# Patient Record
Sex: FEMALE | Race: WHITE | Employment: OTHER | URBAN - METROPOLITAN AREA
[De-identification: names, ages, dates, MRNs, and addresses within clinical notes are randomized per-mention and may not be internally consistent; named-entity substitution may affect disease eponyms.]

---

## 2022-11-04 ENCOUNTER — APPOINTMENT (OUTPATIENT)
Dept: CT IMAGING | Age: 82
End: 2022-11-04
Payer: MEDICARE

## 2022-11-04 ENCOUNTER — APPOINTMENT (OUTPATIENT)
Dept: GENERAL RADIOLOGY | Age: 82
End: 2022-11-04
Payer: MEDICARE

## 2022-11-04 ENCOUNTER — HOSPITAL ENCOUNTER (EMERGENCY)
Age: 82
Discharge: HOME | End: 2022-11-04
Attending: EMERGENCY MEDICINE
Payer: MEDICARE

## 2022-11-04 VITALS
RESPIRATION RATE: 26 BRPM | DIASTOLIC BLOOD PRESSURE: 116 MMHG | HEIGHT: 63 IN | HEART RATE: 83 BPM | WEIGHT: 125 LBS | SYSTOLIC BLOOD PRESSURE: 152 MMHG | OXYGEN SATURATION: 97 % | TEMPERATURE: 98.1 F | BODY MASS INDEX: 22.15 KG/M2

## 2022-11-04 DIAGNOSIS — R55 SYNCOPE, UNSPECIFIED SYNCOPE TYPE: Primary | ICD-10-CM

## 2022-11-04 DIAGNOSIS — B37.9 YEAST INFECTION: ICD-10-CM

## 2022-11-04 DIAGNOSIS — E86.0 DEHYDRATION: ICD-10-CM

## 2022-11-04 LAB
A/G RATIO: 1.6 (ref 1.1–2.2)
ALBUMIN SERPL-MCNC: 3.9 G/DL (ref 3.4–5)
ALP BLD-CCNC: 70 U/L (ref 40–129)
ALT SERPL-CCNC: 16 U/L (ref 10–40)
ANION GAP SERPL CALCULATED.3IONS-SCNC: 8 MMOL/L (ref 3–16)
AST SERPL-CCNC: 23 U/L (ref 15–37)
BASOPHILS ABSOLUTE: 0 K/UL (ref 0–0.2)
BASOPHILS RELATIVE PERCENT: 0.4 %
BILIRUB SERPL-MCNC: 0.3 MG/DL (ref 0–1)
BILIRUBIN URINE: NEGATIVE
BLOOD, URINE: NEGATIVE
BUN BLDV-MCNC: 21 MG/DL (ref 7–20)
CALCIUM SERPL-MCNC: 9.2 MG/DL (ref 8.3–10.6)
CHLORIDE BLD-SCNC: 100 MMOL/L (ref 99–110)
CLARITY: CLEAR
CO2: 24 MMOL/L (ref 21–32)
COLOR: YELLOW
CREAT SERPL-MCNC: <0.5 MG/DL (ref 0.6–1.2)
EOSINOPHILS ABSOLUTE: 0.1 K/UL (ref 0–0.6)
EOSINOPHILS RELATIVE PERCENT: 1.4 %
EPITHELIAL CELLS, UA: ABNORMAL /HPF (ref 0–5)
GFR SERPL CREATININE-BSD FRML MDRD: >60 ML/MIN/{1.73_M2}
GLUCOSE BLD-MCNC: 148 MG/DL (ref 70–99)
GLUCOSE BLD-MCNC: 149 MG/DL (ref 70–99)
GLUCOSE URINE: NEGATIVE MG/DL
HCT VFR BLD CALC: 33.9 % (ref 36–48)
HEMOGLOBIN: 11.7 G/DL (ref 12–16)
KETONES, URINE: NEGATIVE MG/DL
LEUKOCYTE ESTERASE, URINE: NEGATIVE
LYMPHOCYTES ABSOLUTE: 1.5 K/UL (ref 1–5.1)
LYMPHOCYTES RELATIVE PERCENT: 15.5 %
MCH RBC QN AUTO: 33.7 PG (ref 26–34)
MCHC RBC AUTO-ENTMCNC: 34.5 G/DL (ref 31–36)
MCV RBC AUTO: 97.7 FL (ref 80–100)
MICROSCOPIC EXAMINATION: ABNORMAL
MONOCYTES ABSOLUTE: 0.7 K/UL (ref 0–1.3)
MONOCYTES RELATIVE PERCENT: 7 %
NEUTROPHILS ABSOLUTE: 7.5 K/UL (ref 1.7–7.7)
NEUTROPHILS RELATIVE PERCENT: 75.7 %
NITRITE, URINE: NEGATIVE
PDW BLD-RTO: 12.7 % (ref 12.4–15.4)
PERFORMED ON: ABNORMAL
PH UA: 7 (ref 5–8)
PLATELET # BLD: 251 K/UL (ref 135–450)
PMV BLD AUTO: 7.6 FL (ref 5–10.5)
POTASSIUM SERPL-SCNC: 4.5 MMOL/L (ref 3.5–5.1)
PROTEIN UA: NEGATIVE MG/DL
RBC # BLD: 3.47 M/UL (ref 4–5.2)
RBC UA: ABNORMAL /HPF (ref 0–4)
SODIUM BLD-SCNC: 132 MMOL/L (ref 136–145)
SPECIFIC GRAVITY UA: 1.01 (ref 1–1.03)
TOTAL PROTEIN: 6.3 G/DL (ref 6.4–8.2)
TROPONIN: <0.01 NG/ML
URINE TYPE: ABNORMAL
UROBILINOGEN, URINE: 0.2 E.U./DL
WBC # BLD: 9.9 K/UL (ref 4–11)
WBC UA: ABNORMAL /HPF (ref 0–5)
YEAST: PRESENT /HPF

## 2022-11-04 PROCEDURE — 81001 URINALYSIS AUTO W/SCOPE: CPT

## 2022-11-04 PROCEDURE — 96360 HYDRATION IV INFUSION INIT: CPT

## 2022-11-04 PROCEDURE — 99285 EMERGENCY DEPT VISIT HI MDM: CPT

## 2022-11-04 PROCEDURE — 70450 CT HEAD/BRAIN W/O DYE: CPT

## 2022-11-04 PROCEDURE — 84484 ASSAY OF TROPONIN QUANT: CPT

## 2022-11-04 PROCEDURE — 72125 CT NECK SPINE W/O DYE: CPT

## 2022-11-04 PROCEDURE — 80053 COMPREHEN METABOLIC PANEL: CPT

## 2022-11-04 PROCEDURE — 71046 X-RAY EXAM CHEST 2 VIEWS: CPT

## 2022-11-04 PROCEDURE — 6370000000 HC RX 637 (ALT 250 FOR IP): Performed by: PHYSICIAN ASSISTANT

## 2022-11-04 PROCEDURE — 85025 COMPLETE CBC W/AUTO DIFF WBC: CPT

## 2022-11-04 PROCEDURE — 93005 ELECTROCARDIOGRAM TRACING: CPT | Performed by: PHYSICIAN ASSISTANT

## 2022-11-04 PROCEDURE — 2580000003 HC RX 258: Performed by: PHYSICIAN ASSISTANT

## 2022-11-04 RX ORDER — FLUCONAZOLE 100 MG/1
150 TABLET ORAL ONCE
Status: COMPLETED | OUTPATIENT
Start: 2022-11-04 | End: 2022-11-04

## 2022-11-04 RX ORDER — 0.9 % SODIUM CHLORIDE 0.9 %
1000 INTRAVENOUS SOLUTION INTRAVENOUS ONCE
Status: COMPLETED | OUTPATIENT
Start: 2022-11-04 | End: 2022-11-04

## 2022-11-04 RX ORDER — ACETAMINOPHEN 500 MG
1000 TABLET ORAL ONCE
Status: COMPLETED | OUTPATIENT
Start: 2022-11-04 | End: 2022-11-04

## 2022-11-04 RX ADMIN — ACETAMINOPHEN 1000 MG: 500 TABLET ORAL at 20:13

## 2022-11-04 RX ADMIN — FLUCONAZOLE 150 MG: 100 TABLET ORAL at 20:37

## 2022-11-04 RX ADMIN — SODIUM CHLORIDE 1000 ML: 9 INJECTION, SOLUTION INTRAVENOUS at 18:27

## 2022-11-04 ASSESSMENT — PAIN SCALES - GENERAL: PAINLEVEL_OUTOF10: 5

## 2022-11-04 ASSESSMENT — LIFESTYLE VARIABLES
HOW MANY STANDARD DRINKS CONTAINING ALCOHOL DO YOU HAVE ON A TYPICAL DAY: PATIENT DOES NOT DRINK
HOW OFTEN DO YOU HAVE A DRINK CONTAINING ALCOHOL: NEVER

## 2022-11-04 ASSESSMENT — PAIN DESCRIPTION - DESCRIPTORS: DESCRIPTORS: ACHING

## 2022-11-04 ASSESSMENT — ENCOUNTER SYMPTOMS
SHORTNESS OF BREATH: 0
VOMITING: 0
DIARRHEA: 0
CONSTIPATION: 0
ABDOMINAL PAIN: 0
BACK PAIN: 0
EYE REDNESS: 0
EYE PAIN: 0
NAUSEA: 0
COUGH: 0
SORE THROAT: 0

## 2022-11-04 ASSESSMENT — PAIN DESCRIPTION - LOCATION: LOCATION: HEAD

## 2022-11-04 ASSESSMENT — PAIN - FUNCTIONAL ASSESSMENT
PAIN_FUNCTIONAL_ASSESSMENT: ACTIVITIES ARE NOT PREVENTED
PAIN_FUNCTIONAL_ASSESSMENT: NONE - DENIES PAIN

## 2022-11-04 NOTE — ED PROVIDER NOTES
[facility] ENCOUNTER        Pt Name: Tom Olmedo  MRN: [id number]  [place] 1940  Date of evaluation: 11/4/2022  Provider: Mamie Stallings PA-C  PCP: Ramana Guzman MD  Note Started: 6:21 PM EDT       I have seen and evaluated this patient with my supervising physician China Oconnor MD.      Triage CHIEF COMPLAINT       Chief Complaint   Patient presents with    Loss of Consciousness     Pt presents to ED via ambulance for fainting in grocery store after giving blood. Pt states she gave blood about 4pm. Pt denies pain and A&O x4. HISTORY OF PRESENT ILLNESS   (Location/Symptom, Timing/Onset, Context/Setting, Quality, Duration, Modifying Factors, Severity)  Note limiting factors. Chief Complaint: Syncopal episode    Tom Olmedo is a 78 y.o. female who presents to the emergency department after a syncopal episode prior to arrival.  Patient states that she did not get a chance to eat much today after giving blood. States that she went to her physical therapy for her back and then went to give blood. States she then went to the grocery store. When she was carrying her groceries out of the car she started to feel lightheaded. She was able to lean her self up against the car and then she slid down the car. She tried to prop her arm up in order for her head not to hit the ground. She is unsure if it did or not. She is not complaining of any pain at this time, however when she tries to go from a laying to a sitting position she does feel lightheaded. 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 She feels like she just did not eat much today or drink any of her reviewed she was so she feels as if she got lightheaded.   She denies any headache or change in her vision, vomiting, chest pain, shortness of breath, abdominal pain, change in urination or bowel movements at this time.    Nursing Notes were all reviewed and agreed with or any disagreements were addressed in the HPI. REVIEW OF SYSTEMS    (2-9 systems for level 4, 10 or more for level 5)     Review of Systems   Constitutional:  Negative for chills and fever. HENT:  Negative for ear pain and sore throat. Eyes:  Negative for pain and redness. Respiratory:  Negative for cough and shortness of breath. Cardiovascular:  Negative for chest pain and leg swelling. Gastrointestinal:  Negative for abdominal pain, constipation, diarrhea, nausea and vomiting. Genitourinary:  Negative for dysuria and hematuria. Musculoskeletal:  Negative for back pain and neck pain. Skin:  Negative for rash and wound. Neurological:  Positive for light-headedness. Negative for headaches. PAST MEDICAL HISTORY     Past Medical History:   Diagnosis Date    Asthma     Hyperlipidemia     Meniere disease     Meniere's disease, unspecified     Peripheral vascular disease ([facility] [address]     Type II or unspecified type diabetes mellitus without mention of complication, not stated as uncontrolled ([facility] [address]        SURGICAL HISTORY     Past Surgical History:   Procedure Laterality Date    SKIN CANCER EXCISION      TONSILLECTOMY  1941    TUBAL LIGATION      VARICOSE VEIN SURGERY      VENOUS THROMBECTOMY         [address]       Discharge Medication List as of 11/4/2022  8:33 PM        CONTINUE these medications which have NOT CHANGED    Details   lisinopril (PRINIVIL;ZESTRIL) 5 MG tablet Take 5 mg by mouth dailyHistorical Med      atorvastatin (LIPITOR) 80 MG tablet Take 80 mg by mouth dailyHistorical Med      B Complex Vitamins (VITAMIN B COMPLEX) TABS Take 1 tablet by mouth daily. Historical Med      Vitamin Mixture (VITAMIN E COMPLETE PO) Take 1 tablet by mouth every other day Historical Med      vitamin A 97667 UNITS capsule Take 10,000 Units by mouth daily. Historical Med      Multiple Vitamins-Minerals (MULTI FOR HER) TABS Take 1 tablet by mouth daily.  Historical Med      Magnesium Gluconate 250 MG TABS Take 1 tablet by mouth daily. Historical Med      diazepam (VALIUM) 5 MG tablet Take 2.5 mg by mouth nightly as needed Historical Med      amitriptyline (ELAVIL) 25 MG tablet Take 12.5 mg by mouth nightly Historical Med      CRANBERRY Take  by mouth. Historical Med      levothyroxine (SYNTHROID) 50 MCG tablet Take 50 mcg by mouth daily. Historical Med      meclizine (ANTIVERT) 25 MG CHEW Take 25 mg by mouth 3 times daily as needed. Historical Med      Melatonin 3 MG TABS Take 3 mg by mouth nightly. Historical Med      meloxicam (MOBIC) 15 MG tablet Take 15 mg by mouth as needed. Historical Med      metformin (GLUCOPHAGE) 850 MG tablet Take 850 mg by mouth 2 times daily (with meals). Historical Med      paroxetine (PAXIL) 10 MG tablet Take 10 mg by mouth every morning. Historical Med      montelukast (SINGULAIR) 10 MG tablet Take 10 mg by mouth nightly. Historical Med      vitamin D (CHOLECALCIFEROL) 1000 UNIT TABS tablet Take 1,000 Int'l Units by mouth daily.   Historical Med             ALLERGIES     Penicillins    FAMILYHISTORY       Family History   Problem Relation Age of Onset    Heart Disease Father     Heart Disease Maternal Grandmother     High Blood Pressure Mother     Heart Disease Mother     Stroke Mother         SOCIAL HISTORY       Social History     Socioeconomic History    Marital status:    Tobacco Use    Smoking status: Passive Smoke Exposure - Never Smoker   Substance and Sexual Activity    Alcohol use: Yes     Comment: Occasional       SCREENINGS    [place] Coma Scale  Eye Opening: Spontaneous  Best Verbal Response: Oriented  Best Motor Response: Obeys commands  [place] Coma Scale Score: 15        PHYSICAL EXAM    (up to 7 for level 4, 8 or more for level 5)     ED Triage Vitals [11/04/22 1714]   BP Temp Temp Source Pulse Resp SpO2 Height Weight   (!) 124/100 -- Oral -- -- -- 5' 3\" (1.6 m) 125 lb (56.7 kg)       Physical Exam  Constitutional:       General: She is not in acute distress. Appearance: Normal appearance. She is not ill-appearing, toxic-appearing or diaphoretic. HENT:      Head: Normocephalic and atraumatic. Right Ear: External ear normal.      Left Ear: External ear normal.      Nose: Nose normal.   Eyes:      General: No visual field deficit. Right eye: No discharge. Left eye: No discharge. Cardiovascular:      Rate and Rhythm: Normal rate and regular rhythm. Pulses: Normal pulses. Heart sounds: Normal heart sounds. No murmur heard. No gallop. Pulmonary:      Effort: Pulmonary effort is normal. No respiratory distress. Breath sounds: Normal breath sounds. No stridor. No wheezing, rhonchi or rales. Musculoskeletal:         General: Normal range of motion. Cervical back: Normal range of motion. Skin:     General: Skin is warm and dry. Neurological:      General: No focal deficit present. Mental Status: She is alert and oriented to person, place, and time. Cranial Nerves: No cranial nerve deficit, dysarthria or facial asymmetry. Sensory: No sensory deficit. Motor: No pronator drift.       Coordination: Finger-Nose-Finger Test and Heel to [employer] normal.      Comments: Normal truncal stability  No horizontal or vertical nystagmus  Negative test of skew   Psychiatric:         Mood and Affect: Mood normal.         Behavior: Behavior normal.       DIAGNOSTIC RESULTS   LABS:    Labs Reviewed   CBC WITH AUTO DIFFERENTIAL - Abnormal; Notable for the following components:       Result Value    RBC 3.47 (*)     Hemoglobin 11.7 (*)     Hematocrit 33.9 (*)     All other components within normal limits   COMPREHENSIVE METABOLIC PANEL - Abnormal; Notable for the following components:    Sodium 132 (*)     Glucose 148 (*)     BUN 21 (*)     Creatinine <0.5 (*)     Total Protein 6.3 (*)     All other components within normal limits   URINALYSIS WITH MICROSCOPIC - Abnormal; Notable for the following components:    Yeast, UA Present (*)     All other components within normal limits   POCT GLUCOSE - Abnormal; Notable for the following components:    POC Glucose 149 (*)     All other components within normal limits   TROPONIN   POCT GLUCOSE       When ordered, only abnormal lab results are displayed. All other labs were within normal range or not returned as of this dictation. EKG: When ordered, EKG's are interpreted by the Emergency Department Physician in the absence of a cardiologist.  Please see their note for interpretation of EKG. RADIOLOGY:   Non-plain film images such as CT, Ultrasound and MRI are read by the radiologist. Plain radiographic images are visualized andpreliminarily interpreted by the  ED Provider with the below findings:        Interpretation perthe Radiologist below, if available at the time of this note:    CT HEAD WO CONTRAST   Final Result   No acute intracranial findings. CT CERVICAL SPINE WO CONTRAST   Final Result   Multilevel degenerative changes with no acute fracture or traumatic   malalignment. Slight reversal of the normal cervical lordosis is likely degenerative. XR CHEST (2 VW)   Final Result   No acute cardiopulmonary findings           No results found.       PROCEDURES   Unless otherwise noted below, none     Procedures    CRITICAL CARE TIME   N/A    CONSULTS:  None      EMERGENCY DEPARTMENT COURSE and DIFFERENTIAL DIAGNOSIS/MDM:   Vitals:    Vitals:    11/04/22 2005 11/04/22 2010 11/04/22 2015 11/04/22 2030   BP:   123/82 (!) 152/116   Pulse: 80 82 77 83   Resp:       Temp:       TempSrc:       SpO2: 100% 99% 100% 97%   Weight:       Height:           Patient was given thefollowing medications:  Medications   0.9 % sodium chloride bolus (0 mLs IntraVENous Stopped 11/4/22 1956)   acetaminophen (TYLENOL) tablet 1,000 mg (1,000 mg Oral Given 11/4/22 2013)   fluconazole (DIFLUCAN) tablet 150 mg (150 mg Oral Given 11/4/22 2037)         Is this patient to be included in the SEP-1 Core Measure due to severe sepsis or septic shock? No   Exclusion criteria - the patient is NOT to be included for SEP-1 Core Measure due to: Infection is not suspected    This is a 78y.o. year old female with PMH of Ménière's disease, DM2, HTN, HLD who presents to the ED with complaint of lightheadedness, syncopal episode that has been present for the past several hours, prior to arrival.   Vitals upon arrival show mildly hypertensive, otherwise within normal limits. Physical exam shows as above. NIH 0. Blood work shows:   -hyponatremia at 132  -BUN/CREAT is indicative of pre-renal cause, further indicative of dehydration  Urinalysis: yeast, not indicative of cystitis   Imaging was ordered and performed and reviewed and read by radiologist as above showing no acute abnormality. Differential diagnosis includes dehydration, orthostatic hypotension, vasovagal syncope, ACS, other. Medications given: normal saline, diflucan  Symptoms improved. Patient was able to get up and walk to bathroom several times without difficulty. She feels better and ready for son to pick her up. Discussed with her increasing water intake and following up with PCP for further evaluation over the next couple of days. She was in agreement of plan. Discharged in stable condition. I am the Primary Clinician of Record. FINAL IMPRESSION      1. Syncope, unspecified syncope type    2. Dehydration    3.  Yeast infection          DISPOSITION/PLAN   DISPOSITION Decision To Discharge 11/04/2022 08:31:43 PM      PATIENT REFERREDTO:  Claude Escalante MD  [address]   [address] [place] [address]  [phone]    Schedule an appointment as soon as possible for a visit in 1 day  for reevaluation    [facility] [address] [phone]  Go to   As needed, If symptoms worsen    DISCHARGE MEDICATIONS:  Discharge Medication List as of 11/4/2022  8:33 PM          DISCONTINUED MEDICATIONS:  Discharge Medication List as of 11/4/2022  8:33 PM                 (Please note that portions ofthis note were completed with a voice recognition program.  Efforts were made to edit the dictations but occasionally words are mis-transcribed.)    Marycruz Decker PA-C (electronically signed)              Marycruz Decker PA-C  11/05/22 170

## 2022-11-04 NOTE — ED TRIAGE NOTES
Pt presents to ED via ambulance for fainting in grocery store after giving blood. Pt states she gave blood about 4pm. Pt denies pain and A&O x4.

## 2022-11-04 NOTE — ED NOTES
Pt states that she gave blood this after noon but she did not eat a good breakfast or lunch. She states she was planning to eat after giving blood but she got busy and did not eat her muffin, she reports that she has menier's and she has frequent dizzy spells and falls, she has a bruise her left eye from today.       Breanna Guajardo RN  11/04/22 3194

## 2022-11-04 NOTE — ED PROVIDER NOTES
I independently evaluated and obtained a history and physical on [address] I personally saw the patient and performed a substantive portion of the visit including all aspects of the medical decision making. All diagnostic, treatment, and disposition assistants were made to myself in conjunction the advanced practice provider. For further details of this patient's emergency department encounter, please see the advanced practice provider's documentation. History: 75-year-old female with history of Ménière's disease who reports a episode of lightheadedness and syncope after giving blood today. Patient reports that gave blood and couple hours after this began to feel lightheaded and lost consciousness. Patient reports that she could feel herself getting lightheaded and leaned up against her car and could tell she was about to lose conscious. She reports that she slid onto her bottom with her back against the car before losing consciousness. Patient denies any room spinning or vertigo symptoms. Patient denies any facial droop arm or leg numbness or weakness. Patient reports now her symptoms have resolved and she is asymptomatic. Physician Exam: Pleasant elderly  female in no acute distress. Mild abrasion to left frontal scalp. No raccoon sign, marti sign, or hemotympanum. 5 out of 5 strength and sensation in all 4 extremities. Patient ambulatory 50 feet in the emergency department on her own power without any difficulty or symptoms. MDM:    I personally saw the patient and performed a substantive portion of the visit including all aspects of the medical decision making. The Ekg interpreted by me shows  normal sinus rhythm with a rate of 96  Axis is   Normal  QTc is   454ms  Intervals and Durations are unremarkable. ST Segments: normal  No significant change from prior EKG dated 11/4/15    Broad differential diagnosis at this time.   Imaging of the head and chest does not show any acute emergent pathology. Laboratory evaluation shows mild yeast infection but is otherwise unremarkable. Patient is given IV fluids and reports complete resolution of all of her symptoms. She is no focal neurologic deficits, no presyncope upon standing, is ambulatory without any difficulties. I do not suspect CVA, hemodynamic instability, or emergent pathology based on complete lack of symptoms on reevaluation. I primarily suspect dehydration combined with giving blood earlier in the day and as patient is now asymptomatic feel she is appropriate for discharge. Patient has primary care follow-up scheduled in 4 days which I feel is adequate and strict ER return precautions given in the meantime. Patient expresses understanding and agreement this plan and is discharged home. FINAL IMPRESSION      1. Syncope, unspecified syncope type    2. Dehydration    3.  Yeast infection             Khari Che MD  11/04/22 2027

## 2022-11-05 LAB
EKG ATRIAL RATE: 96 BPM
EKG DIAGNOSIS: NORMAL
EKG P AXIS: 46 DEGREES
EKG P-R INTERVAL: 148 MS
EKG Q-T INTERVAL: 360 MS
EKG QRS DURATION: 74 MS
EKG QTC CALCULATION (BAZETT): 454 MS
EKG R AXIS: 54 DEGREES
EKG T AXIS: 67 DEGREES
EKG VENTRICULAR RATE: 96 BPM

## 2023-11-09 LAB
ALBUMIN SERPL-MCNC: 4.4 G/DL (ref 3.4–5)
ALBUMIN/GLOB SERPL: 1.6 {RATIO} (ref 1.1–2.2)
ALP SERPL-CCNC: 86 U/L (ref 40–129)
ALT SERPL-CCNC: 31 U/L (ref 10–40)
ANION GAP SERPL CALCULATED.3IONS-SCNC: 9 MMOL/L (ref 3–16)
AST SERPL-CCNC: 34 U/L (ref 15–37)
BASOPHILS # BLD: 0.1 K/UL (ref 0–0.2)
BASOPHILS NFR BLD: 1.5 %
BILIRUB SERPL-MCNC: 0.4 MG/DL (ref 0–1)
BUN SERPL-MCNC: 16 MG/DL (ref 7–20)
CALCIUM SERPL-MCNC: 9.3 MG/DL (ref 8.3–10.6)
CHLORIDE SERPL-SCNC: 100 MMOL/L (ref 99–110)
CO2 SERPL-SCNC: 29 MMOL/L (ref 21–32)
CREAT SERPL-MCNC: 0.5 MG/DL (ref 0.6–1.2)
CRP SERPL-MCNC: 12.4 MG/L (ref 0–5.1)
DEPRECATED RDW RBC AUTO: 13 % (ref 12.4–15.4)
EOSINOPHIL # BLD: 0.2 K/UL (ref 0–0.6)
EOSINOPHIL NFR BLD: 2.7 %
GFR SERPLBLD CREATININE-BSD FMLA CKD-EPI: >60 ML/MIN/{1.73_M2}
GLUCOSE SERPL-MCNC: 134 MG/DL (ref 70–99)
HCT VFR BLD AUTO: 37.7 % (ref 36–48)
HGB BLD-MCNC: 12.8 G/DL (ref 12–16)
IGA SERPL-MCNC: 395 MG/DL (ref 70–400)
LYMPHOCYTES # BLD: 1.8 K/UL (ref 1–5.1)
LYMPHOCYTES NFR BLD: 21.4 %
MCH RBC QN AUTO: 32.9 PG (ref 26–34)
MCHC RBC AUTO-ENTMCNC: 33.9 G/DL (ref 31–36)
MCV RBC AUTO: 97.1 FL (ref 80–100)
MONOCYTES # BLD: 0.6 K/UL (ref 0–1.3)
MONOCYTES NFR BLD: 7.5 %
NEUTROPHILS # BLD: 5.7 K/UL (ref 1.7–7.7)
NEUTROPHILS NFR BLD: 66.9 %
PLATELET # BLD AUTO: 286 K/UL (ref 135–450)
PMV BLD AUTO: 8.1 FL (ref 5–10.5)
POTASSIUM SERPL-SCNC: 4.8 MMOL/L (ref 3.5–5.1)
PROT SERPL-MCNC: 7.2 G/DL (ref 6.4–8.2)
RBC # BLD AUTO: 3.88 M/UL (ref 4–5.2)
SODIUM SERPL-SCNC: 138 MMOL/L (ref 136–145)
TSH SERPL DL<=0.005 MIU/L-ACNC: 0.44 UIU/ML (ref 0.27–4.2)
WBC # BLD AUTO: 8.5 K/UL (ref 4–11)

## 2023-11-12 LAB — MISCELLANEOUS LAB TEST ORDER: NORMAL

## 2024-03-05 ENCOUNTER — HOSPITAL ENCOUNTER (OUTPATIENT)
Dept: GENERAL RADIOLOGY | Age: 84
Discharge: HOME | End: 2024-03-05
Attending: UROLOGY
Payer: MEDICARE

## 2024-03-05 ENCOUNTER — HOSPITAL ENCOUNTER (OUTPATIENT)
Age: 84
Discharge: HOME | End: 2024-03-05
Payer: MEDICARE

## 2024-03-05 PROCEDURE — 74018 RADEX ABDOMEN 1 VIEW: CPT
